# Patient Record
Sex: FEMALE | Race: WHITE | NOT HISPANIC OR LATINO | Employment: UNEMPLOYED | URBAN - NONMETROPOLITAN AREA
[De-identification: names, ages, dates, MRNs, and addresses within clinical notes are randomized per-mention and may not be internally consistent; named-entity substitution may affect disease eponyms.]

---

## 2017-05-09 ENCOUNTER — OFFICE VISIT (OUTPATIENT)
Dept: FAMILY MEDICINE CLINIC | Facility: CLINIC | Age: 57
End: 2017-05-09

## 2017-05-09 VITALS
BODY MASS INDEX: 41.82 KG/M2 | HEIGHT: 65 IN | SYSTOLIC BLOOD PRESSURE: 124 MMHG | TEMPERATURE: 98.4 F | RESPIRATION RATE: 16 BRPM | WEIGHT: 251 LBS | HEART RATE: 102 BPM | OXYGEN SATURATION: 98 % | DIASTOLIC BLOOD PRESSURE: 86 MMHG

## 2017-05-09 DIAGNOSIS — R31.9 HEMATURIA: Primary | ICD-10-CM

## 2017-05-09 LAB
BILIRUB BLD-MCNC: NEGATIVE MG/DL
CLARITY, POC: ABNORMAL
COLOR UR: YELLOW
DEVELOPER EXPIRATION DATE: NORMAL
DEVELOPER LOT NUMBER: NORMAL
EXPIRATION DATE: NORMAL
FECAL OCCULT BLOOD SCREEN, POC: NEGATIVE
GLUCOSE UR STRIP-MCNC: NEGATIVE MG/DL
KETONES UR QL: NEGATIVE
LEUKOCYTE EST, POC: ABNORMAL
Lab: NORMAL
NEGATIVE CONTROL: NEGATIVE
NITRITE UR-MCNC: NEGATIVE MG/ML
PH UR: 5.5 [PH] (ref 5–8)
POSITIVE CONTROL: POSITIVE
PROT UR STRIP-MCNC: NEGATIVE MG/DL
RBC # UR STRIP: ABNORMAL /UL
SP GR UR: 1 (ref 1–1.03)
UROBILINOGEN UR QL: NORMAL

## 2017-05-09 PROCEDURE — 81003 URINALYSIS AUTO W/O SCOPE: CPT | Performed by: FAMILY MEDICINE

## 2017-05-09 PROCEDURE — 82270 OCCULT BLOOD FECES: CPT | Performed by: FAMILY MEDICINE

## 2017-05-09 PROCEDURE — 99203 OFFICE O/P NEW LOW 30 MIN: CPT | Performed by: FAMILY MEDICINE

## 2017-05-09 RX ORDER — ALPRAZOLAM 1 MG/1
TABLET ORAL DAILY
COMMUNITY
Start: 2017-05-02

## 2017-05-12 ENCOUNTER — OFFICE VISIT (OUTPATIENT)
Dept: FAMILY MEDICINE CLINIC | Facility: CLINIC | Age: 57
End: 2017-05-12

## 2017-05-12 VITALS
DIASTOLIC BLOOD PRESSURE: 82 MMHG | OXYGEN SATURATION: 98 % | HEART RATE: 96 BPM | RESPIRATION RATE: 16 BRPM | TEMPERATURE: 97.8 F | BODY MASS INDEX: 42.42 KG/M2 | SYSTOLIC BLOOD PRESSURE: 124 MMHG | WEIGHT: 254.6 LBS | HEIGHT: 65 IN

## 2017-05-12 DIAGNOSIS — E66.01 MORBID OBESITY DUE TO EXCESS CALORIES (HCC): ICD-10-CM

## 2017-05-12 DIAGNOSIS — H40.053 INTRAOCULAR PRESSURE INCREASE, BILATERAL: ICD-10-CM

## 2017-05-12 DIAGNOSIS — R35.0 FREQUENCY OF URINATION: Primary | ICD-10-CM

## 2017-05-12 DIAGNOSIS — R31.9 HEMATURIA: ICD-10-CM

## 2017-05-12 LAB
BACTERIA UR CULT: ABNORMAL
BACTERIA UR CULT: ABNORMAL
BILIRUB BLD-MCNC: NEGATIVE MG/DL
CLARITY, POC: CLEAR
COLOR UR: YELLOW
GLUCOSE UR STRIP-MCNC: NEGATIVE MG/DL
KETONES UR QL: NEGATIVE
LEUKOCYTE EST, POC: ABNORMAL
NITRITE UR-MCNC: NEGATIVE MG/ML
OTHER ANTIBIOTIC SUSC ISLT: ABNORMAL
PH UR: 5.5 [PH] (ref 5–8)
PROT UR STRIP-MCNC: NEGATIVE MG/DL
RBC # UR STRIP: ABNORMAL /UL
SP GR UR: 1 (ref 1–1.03)
UROBILINOGEN UR QL: NORMAL

## 2017-05-12 PROCEDURE — 99214 OFFICE O/P EST MOD 30 MIN: CPT | Performed by: FAMILY MEDICINE

## 2017-05-12 PROCEDURE — 81003 URINALYSIS AUTO W/O SCOPE: CPT | Performed by: FAMILY MEDICINE

## 2017-05-12 RX ORDER — SULFAMETHOXAZOLE AND TRIMETHOPRIM 200; 40 MG/5ML; MG/5ML
20 SUSPENSION ORAL 2 TIMES DAILY
Qty: 200 ML | Refills: 0 | Status: SHIPPED | OUTPATIENT
Start: 2017-05-12 | End: 2017-05-17

## 2017-05-15 LAB
BACTERIA UR CULT: ABNORMAL
BACTERIA UR CULT: ABNORMAL
OTHER ANTIBIOTIC SUSC ISLT: ABNORMAL

## 2017-05-17 ENCOUNTER — TELEPHONE (OUTPATIENT)
Dept: FAMILY MEDICINE CLINIC | Facility: CLINIC | Age: 57
End: 2017-05-17

## 2017-08-22 ENCOUNTER — TRANSCRIBE ORDERS (OUTPATIENT)
Dept: ADMINISTRATIVE | Facility: HOSPITAL | Age: 57
End: 2017-08-22

## 2017-08-22 DIAGNOSIS — Z12.31 ENCOUNTER FOR SCREENING MAMMOGRAM FOR MALIGNANT NEOPLASM OF BREAST: Primary | ICD-10-CM

## 2017-08-30 ENCOUNTER — HOSPITAL ENCOUNTER (OUTPATIENT)
Dept: MAMMOGRAPHY | Facility: HOSPITAL | Age: 57
Discharge: HOME | End: 2017-08-30
Admitting: OBSTETRICS & GYNECOLOGY

## 2017-08-30 DIAGNOSIS — Z12.31 ENCOUNTER FOR SCREENING MAMMOGRAM FOR MALIGNANT NEOPLASM OF BREAST: ICD-10-CM

## 2017-08-30 PROCEDURE — 77063 BREAST TOMOSYNTHESIS BI: CPT

## 2017-08-30 PROCEDURE — G0202 SCR MAMMO BI INCL CAD: HCPCS

## 2018-02-22 DIAGNOSIS — Z20.828 EXPOSURE TO INFLUENZA: Primary | ICD-10-CM

## 2018-02-22 RX ORDER — OSELTAMIVIR PHOSPHATE 75 MG/1
75 CAPSULE ORAL DAILY
Qty: 10 CAPSULE | Refills: 0 | Status: SHIPPED | OUTPATIENT
Start: 2018-02-22 | End: 2018-02-28 | Stop reason: SDDI

## 2018-02-28 ENCOUNTER — OFFICE VISIT (OUTPATIENT)
Dept: FAMILY MEDICINE CLINIC | Facility: CLINIC | Age: 58
End: 2018-02-28

## 2018-02-28 VITALS
OXYGEN SATURATION: 98 % | TEMPERATURE: 98.1 F | HEART RATE: 105 BPM | WEIGHT: 251 LBS | RESPIRATION RATE: 18 BRPM | HEIGHT: 65 IN | SYSTOLIC BLOOD PRESSURE: 124 MMHG | DIASTOLIC BLOOD PRESSURE: 88 MMHG | BODY MASS INDEX: 41.82 KG/M2

## 2018-02-28 DIAGNOSIS — R52 BODY ACHES: Primary | ICD-10-CM

## 2018-02-28 DIAGNOSIS — J10.1 INFLUENZA B: ICD-10-CM

## 2018-02-28 DIAGNOSIS — J40 BRONCHITIS: ICD-10-CM

## 2018-02-28 LAB
EXPIRATION DATE: NORMAL
FLUAV AG NPH QL: NEGATIVE
FLUBV AG NPH QL: POSITIVE
INTERNAL CONTROL: NORMAL
Lab: NORMAL

## 2018-02-28 PROCEDURE — 99203 OFFICE O/P NEW LOW 30 MIN: CPT | Performed by: NURSE PRACTITIONER

## 2018-02-28 PROCEDURE — 87804 INFLUENZA ASSAY W/OPTIC: CPT | Performed by: NURSE PRACTITIONER

## 2018-02-28 RX ORDER — PREDNISONE 10 MG/1
10 TABLET ORAL DAILY
Qty: 5 TABLET | Refills: 0 | Status: SHIPPED | OUTPATIENT
Start: 2018-02-28 | End: 2018-03-05

## 2018-02-28 RX ORDER — AZITHROMYCIN 250 MG/1
TABLET, FILM COATED ORAL
Qty: 6 TABLET | Refills: 0 | Status: SHIPPED | OUTPATIENT
Start: 2018-02-28 | End: 2020-08-17

## 2018-02-28 NOTE — PROGRESS NOTES
Chief Complaint   Patient presents with   • URI     Patient is here today for coughing, fever and body aches. Symptoms started 5 days ago.         Allergies   Allergen Reactions   • Ciprofloxacin    • Cleocin [Clindamycin Hcl]    • Medrol [Methylprednisolone]    • Penicillins        HPI:  Violetta Arboleda is a 57 y.o. female presents who today complaints of sudden onset of sore throat, cough, congestion, fever and chills.   had flu B last week.  She had a CT of lungs last year and was told she has banding in the RML and calcified lymph nodes in the left.  Has seen lung specialist who said it just needed to be watched.  She says she has laid flat for 2 days and her  made her get up and come in.  She does have sputum production sometimes green sometimes clear.   Says that she has to take nexium for years and that she had to quit taking it because it affects her legs because they will swell, and ache so now that she is off of it reflux is severe, and taking zantac 150 twice daily but it is not working.  Wants to know if she can take 300mg zantac instead I educated her that is not a recommended dose.  Discuss with her PCP.  Also discussed genetic testing, she declines at present.     PCP currently listed as No Known Provider.     Past Medical History:   Diagnosis Date   • Anxiety    • Low back pain      Past Surgical History:   Procedure Laterality Date   • BLADDER REPAIR     • BREAST BIOPSY Left    • CHOLECYSTECTOMY     • ESOPHAGUS SURGERY     • HYSTERECTOMY     • RECTAL SURGERY       Social History     Social History   • Marital status:      Spouse name: N/A   • Number of children: N/A   • Years of education: N/A     Occupational History   • Not on file.     Social History Main Topics   • Smoking status: Former Smoker   • Smokeless tobacco: Never Used   • Alcohol use No   • Drug use: No   • Sexual activity: Yes     Partners: Male      Comment: Anamika.      Other Topics Concern   • Not on file  "    Social History Narrative       Family History   Problem Relation Age of Onset   • Lung cancer Mother    • Thyroid disease Mother    • Lymphoma Father    • Thyroid disease Father    • Alcohol abuse Brother    • No Known Problems Daughter    • No Known Problems Brother    • No Known Problems Daughter    • Breast cancer Cousin 30   • Colon cancer Neg Hx    • Hyperlipidemia Neg Hx    • Hypertension Neg Hx        Current Outpatient Prescriptions:   •  ALPRAZolam (XANAX) 1 MG tablet, Daily., Disp: , Rfl:   •  oseltamivir (TAMIFLU) 75 MG capsule, Take 1 capsule by mouth Daily for 10 days., Disp: 10 capsule, Rfl: 0      ROS:  REVIEW OF SYMPTOMS: (Positives bolded)  General:  weight loss, fever, chills, night sweats, fatigue, appetite loss  HEENT:  sore throat tinnitus, bloody nose, hearin gloss, sinus pain/pressure, ear pain/pressure.   Respiratory: shortness of breath, cough, hemoptysis, wheezing, pleurisy,   Cardiovascular:  chest pain, PND, palpitation, edema, orthopnea, syncope  Gastro: Nausea, vomiting, diarrhea, hematemesis, abdominal pain, constipation  Genito: hematuria, dysuria, glycosuria, hesitancy, frequency, incontinence  Musckelo: Arthralgia, myalgia, muscle weakness, joint swelling, NSAID use  Skin: rash, pruritis, sores, nail changes, change in wart/mole, itching  Psychiatric: depression, anxiety, anti-depressants, insomnia, mood changes  \"All other systems reviewed and negative, except as listed above.”    OBJECTIVE:  Physical Exam   Constitutional: The patient is oriented to person, place, and time. The patient appears well-developed and well-nourished. No distress.   HENT:   Head: Normocephalic and atraumatic.   Right Ear: Tympanic membrane and external ear normal.   Left Ear: Tympanic membrane and external ear normal.   Nose: Mucosal edema and rhinorrhea present. Boggy turbinates.  No epistaxis.  No foreign bodies.   Mouth/Throat: Normal dentition. No uvula swelling. Posterior oropharyngeal erythema " "present. No oropharyngeal exudate, posterior oropharyngeal edema or tonsillar abscesses.   Eyes: Conjunctivae and EOM are normal. Pupils are equal, round, and reactive to light.   Neck: Normal range of motion. No thyromegaly present.   Cardiovascular: Normal rate, regular rhythm, normal heart sounds and intact distal pulses.    Pulmonary/Chest: Effort normal. No accessory muscle usage. No respiratory distress.  Has wheezes in the upper lobes,  No rales or rhonchi noted.  Has deep cough in office   Musculoskeletal: Normal range of motion. Exhibits no tenderness.  Normal use of extremities, symmetrical.   Lymphadenopathy: No anterior/posterior cervical adenopathy.   Neurological: Alert and oriented to person, place, and time. No noted cranial nerve deficit. Moves all 4, symmetrical.   Skin: Skin is warm. No rash noted.   Psychiatric: Normal mood and affect. The behavior is normal. Judgment and thought content appear normal.     /88 (BP Location: Right arm, Patient Position: Sitting, Cuff Size: Large Adult)  Pulse 105  Temp 98.1 °F (36.7 °C) (Oral)   Resp 18  Ht 165.1 cm (65\")  Wt 114 kg (251 lb)  SpO2 98%  BMI 41.77 kg/m2    Assessment/Plan  Violetta was seen today for uri.    Diagnoses and all orders for this visit:    Body aches  -     POCT Influenza A/B see below    Influenza B  -     predniSONE (DELTASONE) 10 MG tablet; Take 1 tablet by mouth Daily for 5 days.    Bronchitis  -     azithromycin (ZITHROMAX) 250 MG tablet; Take 2 tablets the first day, then 1 tablet daily for 4 days.        -    Declines tessalon pearles  Has some at home    Has tamiflu at home but says she didn't take it because she is afraid of the side effects.  Educated pt to take the tamiflu       POCT Influenza A/B   Order: [id number]   Status:  Final result   Visible to patient:  No (Not Released) Dx:  Body aches         Ref Range & Units 1:39 PM   9mo ago      Rapid Influenza A Ag  negative     Rapid Influenza B Ag  positive     " Internal Control Passed Passed     Lot Number  [id number]    Expiration Date  11/08/2020 8/17   Resulting Agency  [facility]      Specimen Collected: 02/28/18  1:39 PM Last Resulted: 02/28/18  1:39 PM                        Education:  • Allergies to medications and abx reviewed.    • The patient voiced understanding and agrees to listed therapy  • Steroids by mouth discussed.    • Discussed benefits of nasal steroid and to consider daily second gen antihistamine.   Discussed risks/benefits of OTC decongestants.  Caution with blood pressure.   • Consider netti pot. f/u in 1-2 weeks if not improving.  • Medications as listed   • Allergy recommendations discussed.    • Would change pillowcases and wash with hot/dry hot 2x weekly and change sheets       minimum weekly. Consider anti-allergenic coverings for sheets/pillowcases.  • Avoid tobacco, Keep pets out of room of sleeping as able.  • Use home circulation instead of windows down.    • Take abx with food to decrease risks of diarrhea. Increased yogurt to decrease this   • risk further  • Consider probiotics.  •  Females: If taking antibiotics and using birth control at the same time it is important   • to use a backup method of birth control for 2-4 weeks as antibiotics can decrease   • Efficacy of the birth control.   • Cover your cough/sneezes to reduce infection of others  • Risks/benefits of current and new medications discussed with the patient and or family today.  The pt/family are aware and accept that if there are any side effects they should call or return to clinic as soon as possible.  Appropriate F/U advised      • All questions were answered to the satisfactory state of patient/family.  Should symptoms fail to improve or worsen they agree to call or return to clinic or to go to the ER. Education handouts were offered on any new Rx if requested.     • Discussed the importance of following up  with any needed screening tests/labs/specialist appointments and any requested follow-up recommended by me today.  Importance of maintaining follow-up discussed and patient accepts that   • missed appointments can delay diagnosis and potentially lead to worsening of conditions.    COUGH:  Acute bronchitis is a self-limited inflammation of the bronchi with clinical symptoms of cough, low grade fever, + sputum production.  This cannot be distinguished clinically from URI in first 4-5 days of illness.  Dx of bronchitis should be considered if cough >5 days.  DDX discussed today include viral processes such as Rhino (warmer months), corona, adeno, parainfluenza (cooler months), acute bronchitis unspecified, Allergic rhinitis with cough, post nasal drip syndrome, COPD, and less likely GERD, asthma and pneumonia (less likely based on history/examination absence of higher fever, systemic findings and peripheral pulmonary process).  Smoking status discussed if applicable. Treatment options typically directed towards symptom management. We discussed that studies do not typically support use of abx for treatment of bronchitis.  The patient voiced understanding. Cough can last up to 21 days with ¾ of patients having resolution of symptoms by day 14. Dextromethorphan has typically not helped with cough in children.  Studies have shown benefit to bronchodilators when wheezing is present.  Discussed limited benefit to any medications for bronchitis.  Dark honey can be a benefit.   If limited improvement or worsening over next week the patient/family can contact clinic to consider starting abx azithromycin vs doxycycline.  No abx today as these are of limited benefit.  Discussed pros and cons of steroid use both injectable and oral.  F/U in 1-2 weeks or PRN if not improving.  May consider CXR at that point as well. It is important to maintain hydration to prevent mucus thickening.  If cough changes or symptoms change f/u in office  sooner. Consider steroid for bronchospasm.  Consider albuterol for bronchospasm and consider Atrovent as well.  The patient and I discussed today that Abx are not typically used for chest cold, bronchitis etc. We discussed that this can take up to 3 weeks to resolve.  Honey of? benefit. Humidified air of? benefit. Discussed limited benefit to dextromethorphan and codeine. Abx may lead to resistance, side effects and do not shorten the course of illness.   · Limited benefit to dextromethorphan and codeine and guaifenesin from [place] reviews.  · Caution advised with combination medications. Watch for excess Tylenol as this is acetaminophen and is present in numerous over the counter combination medications.  Also, be aware of ingredients as these can be duplicated in combination medications if taking various types together.  If questions, check with pharmacist or call.  · Injection Dexamethasone R/B/A to med's d/w patient, SE reviewed as listed below  · Offered handout to patient on Bronchitis  · Allergy recommendations discussed.  Would change pillowcases and wash with hot/dry hot 2x weekly and change sheets minimum weekly. Consider anti-allergenic coverings for sheets/pillowcases.  Avoid tobacco, keep pets out of room of sleeping as able.  Use home circulation instead of windows down.  Nasal steroids discussed today.  · Risks/benefits of abx and steroids discussed with patient today.  ¨ Take abx with food to decrease risks of diarrhea. Increased yogurt to decrease this risk further  ¨ Consider probiotics.  · OTC medications:  ¨ Acetaminophen (Tylenol).  Follow package insert. Discussed risks/benefits of acetaminophen.  Do not take more than 2000 mg Tylenol per day. Discussed recent changes by FDA for risks of MI.  Caution advised with combination medications. Watch for excess Tylenol (acetaminophen) and is present in numerous over the counter combination medications.  Also, be aware of ingredients as these can be  duplicated in combination medications if taking various types together.  If questions, check with pharmacist or call.  ¨ NSAID'S (Ibuprofen/Aleve/Diclofenac/Mobic) follow package insert. NSAID's work by blocking natural substances that cause inflammation.   Discussed risks benefits of Ibuprofen/Aleve/Diclofenac/Mobic for pain. Reviewed recent FDA changes regarding increased risks for MI. The patient is aware of risks.  GI Prophylaxis discussed in relation to use of steroids and or NSAID'S.  Discussed NSAID'S may rarely increase risk for MI/stroke, which may be greater if heart disease is present, tobacco use or diabetic for example.  Rx may increase risks of GI bleed, platelet dysfunction that can occur at any time. May increase risks of kidney damage/disease.  Should not use before or after CABG or major surgery without direction. Side effects can include dizziness, drowsiness, HA upset stomach to name a few.  If any severe symptoms develop please call or f/u in clinic.  · Fluid hydration to be maintained.  · Good Hand Washing encouraged.   · Cover cough/sneeze with sleeve/elbow crease.   · OTC cough medications typically just as good as Rx varieties.  Cough drops, humidifier in room of sleeping and rest are recommended.    BMI 41.8  Did not do obesity education because pt is here for a sick visit, as well as she has a pcp in [place].  We address obesity at next visit.    An After Visit Summary was printed and given to the patient at discharge.    I spoke with the patient about the benefits and risks associated with antibiotic therapy; the benefits include treating a bacterial infection, preventing the spread of disease, and minimizing serious complications associated with bacterial diseases; the risks include antibiotic resistance, allergic reactions, oral and/ or vaginal candidia, and GI related side effects such as an upset stomach and diarrhea, as well as placing the patient at an increase risk for C-diff;  verbal acknowledgement of these risk were obtained; based on the patients complaint and clinical finding, no antibiotics are required at this time.      Return in about 1 week (around 3/7/2018), or if symptoms worsen or fail to improve.         Jackie Brian, TOD, APRN-BC  .date

## 2018-08-09 ENCOUNTER — TRANSCRIBE ORDERS (OUTPATIENT)
Dept: ADMINISTRATIVE | Facility: HOSPITAL | Age: 58
End: 2018-08-09

## 2018-08-09 DIAGNOSIS — Z12.31 ENCOUNTER FOR SCREENING MAMMOGRAM FOR MALIGNANT NEOPLASM OF BREAST: Primary | ICD-10-CM

## 2018-09-07 ENCOUNTER — HOSPITAL ENCOUNTER (OUTPATIENT)
Dept: MAMMOGRAPHY | Facility: HOSPITAL | Age: 58
Discharge: HOME | End: 2018-09-07
Admitting: OBSTETRICS & GYNECOLOGY

## 2018-09-07 DIAGNOSIS — Z12.31 ENCOUNTER FOR SCREENING MAMMOGRAM FOR MALIGNANT NEOPLASM OF BREAST: ICD-10-CM

## 2018-09-07 PROCEDURE — 77067 SCR MAMMO BI INCL CAD: CPT

## 2018-09-07 PROCEDURE — 77063 BREAST TOMOSYNTHESIS BI: CPT

## 2019-10-02 ENCOUNTER — TRANSCRIBE ORDERS (OUTPATIENT)
Dept: ADMINISTRATIVE | Facility: HOSPITAL | Age: 59
End: 2019-10-02

## 2019-10-02 DIAGNOSIS — Z12.39 SCREENING BREAST EXAMINATION: Primary | ICD-10-CM

## 2019-10-08 ENCOUNTER — HOSPITAL ENCOUNTER (OUTPATIENT)
Dept: MAMMOGRAPHY | Facility: HOSPITAL | Age: 59
Discharge: HOME | End: 2019-10-08
Admitting: OBSTETRICS & GYNECOLOGY

## 2019-10-08 PROCEDURE — 77067 SCR MAMMO BI INCL CAD: CPT

## 2019-10-08 PROCEDURE — 77063 BREAST TOMOSYNTHESIS BI: CPT

## 2020-08-17 ENCOUNTER — TELEMEDICINE (OUTPATIENT)
Dept: FAMILY MEDICINE CLINIC | Facility: CLINIC | Age: 60
End: 2020-08-17

## 2020-08-17 DIAGNOSIS — J01.00 ACUTE NON-RECURRENT MAXILLARY SINUSITIS: Primary | ICD-10-CM

## 2020-08-17 PROCEDURE — 99213 OFFICE O/P EST LOW 20 MIN: CPT | Performed by: NURSE PRACTITIONER

## 2020-08-17 RX ORDER — CLARITHROMYCIN 250 MG/5ML
500 FOR SUSPENSION ORAL 2 TIMES DAILY
Qty: 140 ML | Refills: 0 | Status: SHIPPED | OUTPATIENT
Start: 2020-08-17 | End: 2020-08-24

## 2020-08-17 RX ORDER — PSEUDOEPHEDRINE HCL 30 MG
30 TABLET ORAL EVERY 4 HOURS PRN
Qty: 30 TABLET | Refills: 0 | Status: SHIPPED | OUTPATIENT
Start: 2020-08-17

## 2020-08-17 NOTE — PROGRESS NOTES
Subjective   Violetta Arboleda is a 60 y.o. female [facility] zoom visit for sinusitis.      History of Present Illness   Sinusitis  Chronic. Recurring. This episode began about 5 days ago. She reports vertigo about 2 weeks ago. Maxillary sinus pain. Left worse than right. Congestion. Dental pain. Has tried ice water gargles, resting, sudafed and ibuprofen, sinus massages all with minimal relief. Denies sore throat, ear pain, cough, fever. Has an appt with dentist in 1 week as well.       The following portions of the patient's history were reviewed and updated as appropriate: allergies, current medications, past family history, past medical history, past social history, past surgical history and problem list.    Review of Systems   Constitutional: Positive for activity change. Negative for chills, fatigue and fever.   HENT: Positive for congestion, dental problem, sinus pressure and sinus pain. Negative for ear pain, postnasal drip and sore throat.    Respiratory: Negative for cough and shortness of breath.    Musculoskeletal: Negative for myalgias.       Objective     No vital signs or bmi obtained for this encounter.      Physical exam-  · General appearance- Alert, appears comfortable. Dressed appropriately. No distress.   · HEENT- external examination of eyes, ears and nose all normal. Head is atraumatic. Hearing normal.   · Neck is symmetric, trachea midline.   · Normal respiratory effort.   · Digits and nails appear healthy. No clubbing, rashes or discolorations.   · Normal range of motion of all extremities.  · Mood appropriate. Communicates easily. Normal judgement and insight. Oriented to time, place and person. Memory appears intact.            Assessment/Plan   Violetta was seen today for sinusitis.    Diagnoses and all orders for this visit:    Acute non-recurrent maxillary sinusitis    Other orders  -     clarithromycin (BIAXIN) 250 MG/5ML suspension; Take 10 mL by mouth 2 (Two)  Times a Day for 7 days.  -     pseudoephedrine (Sudafed) 30 MG tablet; Take 1 tablet by mouth Every 4 (Four) Hours As Needed for Congestion.          Return in about 1 week (around 8/24/2020), or if symptoms worsen or fail to improve.             Electronically signed by NAILA Storey, 08/17/20, 8:52 AM.

## 2020-11-02 ENCOUNTER — TRANSCRIBE ORDERS (OUTPATIENT)
Dept: ADMINISTRATIVE | Facility: HOSPITAL | Age: 60
End: 2020-11-02

## 2020-11-02 DIAGNOSIS — Z12.31 OTHER SCREENING MAMMOGRAM: Primary | ICD-10-CM

## 2020-11-11 ENCOUNTER — APPOINTMENT (OUTPATIENT)
Dept: MAMMOGRAPHY | Facility: HOSPITAL | Age: 60
End: 2020-11-11

## 2021-06-01 ENCOUNTER — APPOINTMENT (OUTPATIENT)
Dept: MAMMOGRAPHY | Facility: HOSPITAL | Age: 61
End: 2021-06-01